# Patient Record
Sex: MALE | Race: WHITE | NOT HISPANIC OR LATINO | URBAN - METROPOLITAN AREA
[De-identification: names, ages, dates, MRNs, and addresses within clinical notes are randomized per-mention and may not be internally consistent; named-entity substitution may affect disease eponyms.]

---

## 2022-05-18 ENCOUNTER — TELEHEALTH PROVIDED OTHER THAN IN PATIENT'S HOME (OUTPATIENT)
Dept: URBAN - METROPOLITAN AREA TELEHEALTH 12 | Facility: TELEHEALTH | Age: 22
End: 2022-05-18

## 2022-05-18 VITALS — WEIGHT: 125 LBS | HEIGHT: 73 IN

## 2022-05-18 DIAGNOSIS — R63.4 ABNORMAL WEIGHT LOSS: ICD-10-CM

## 2022-05-18 DIAGNOSIS — R59.9 ENLARGED LYMPH NODES, UNSPECIFIED: ICD-10-CM

## 2022-05-18 PROCEDURE — 99204 OFFICE O/P NEW MOD 45 MIN: CPT | Mod: 95 | Performed by: PHYSICIAN ASSISTANT

## 2022-05-18 NOTE — SERVICEHPINOTES
PATIENT VERIFIED BY DATE OF BIRTH AND NAME. Patient has been consented for this telecommunication visit.   Mr. Manley is here to discuss weight loss. In 2015, he had some "abnormal lymph nodes" removed from his abdomen found to have "inconclusive results". 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 His mother had lymphoma. 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 The patient has been consistently losing weight since kidney transplant in 2016. 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 Since 2016, he has lost at least 60 lbs since then. The weight loss has plateaued since December of 2021. Pt states that he eats normally mom states that he is eating 1596-3063 calories per day. He denies nausea, vomiting, early satiety, anorexia, or dysphagia. No constipation, diarrhea, blood in the stool.  Normal CMP, CBC. Per his mother, his mother states that Dr. Rose thyroid function and it was normal.

## 2023-02-16 ENCOUNTER — OFFICE (OUTPATIENT)
Dept: URBAN - METROPOLITAN AREA CLINIC 79 | Facility: CLINIC | Age: 23
End: 2023-02-16

## 2023-02-16 VITALS
WEIGHT: 129 LBS | SYSTOLIC BLOOD PRESSURE: 113 MMHG | HEIGHT: 73 IN | HEART RATE: 75 BPM | DIASTOLIC BLOOD PRESSURE: 73 MMHG | TEMPERATURE: 98.2 F

## 2023-02-16 DIAGNOSIS — R63.4 ABNORMAL WEIGHT LOSS: ICD-10-CM

## 2023-02-16 PROCEDURE — 99214 OFFICE O/P EST MOD 30 MIN: CPT | Performed by: PHYSICIAN ASSISTANT

## 2023-02-16 NOTE — SERVICEHPINOTES
I initially saw this pt 05/22 d/t concerns of weight loss. PMHX:   In 2015, he had some "abnormal lymph nodes" removed from his abdomen found to have "inconclusive results". 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 His mother had lymphoma. 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 The patient has been consistently losing weight since kidney transplant in 2016. 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 Since 2016, he has lost at least 60 lbs since then. The weight loss has plateaued since December of 2021. Pt states that he eats normally mom states that he is eating 5415-7020 calories per day. He denies nausea, vomiting, early satiety, anorexia, or dysphagia. No constipation, diarrhea, blood in the stool.  Normal CMP, CBC. Per his mother, his mother states that Dr. Rose thyroid function and it was normal.
br
Jeanmarie obtained the following tests which were unremarkable: celiac panel, CRP, sed rate, and fecal calprotectin. No further weight loss still. No abdominal pain, nausea, vomiting, anorexia, or early satiety. No constipation, diarrhea, blood in stool.

## 2023-02-20 LAB
OVA + PARASITE EXAM: (no result)
RESULT: RESULT 1: (no result)
STOOL CULTURE: CAMPYLOBACTER CULTURE: (no result)
STOOL CULTURE: E COLI SHIGA TOXIN EIA: NEGATIVE
STOOL CULTURE: SALMONELLA/SHIGELLA SCREEN: (no result)